# Patient Record
Sex: MALE | Race: OTHER | Employment: OTHER | URBAN - METROPOLITAN AREA
[De-identification: names, ages, dates, MRNs, and addresses within clinical notes are randomized per-mention and may not be internally consistent; named-entity substitution may affect disease eponyms.]

---

## 2022-02-10 ENCOUNTER — CONSULTATION/EVALUATION (OUTPATIENT)
Dept: URBAN - METROPOLITAN AREA CLINIC 39 | Facility: CLINIC | Age: 82
End: 2022-02-10

## 2022-02-10 DIAGNOSIS — H25.811: ICD-10-CM

## 2022-02-10 DIAGNOSIS — H35.3131: ICD-10-CM

## 2022-02-10 DIAGNOSIS — H25.812: ICD-10-CM

## 2022-02-10 DIAGNOSIS — Z98.890: ICD-10-CM

## 2022-02-10 DIAGNOSIS — H18.513: ICD-10-CM

## 2022-02-10 PROCEDURE — 92136TC INTERFEROMETRY - TECHNICAL COMPONENT

## 2022-02-10 PROCEDURE — 92025CV CORNEAL TOPOGRAPHY, CV

## 2022-02-10 PROCEDURE — 92015 DETERMINE REFRACTIVE STATE: CPT

## 2022-02-10 PROCEDURE — 92014 COMPRE OPH EXAM EST PT 1/>: CPT

## 2022-02-10 PROCEDURE — V2799PMN IMPRIMIS PRED-MOXI-NEPAF 5ML

## 2022-02-10 ASSESSMENT — VISUAL ACUITY
OD_SC: J12-
OD_RAM: 20/20
OS_AM: 20/20-2
OD_CC: J8
OS_BAT: 20/400
OD_SC: 20/40-2
OS_SC: J10
OS_SC: 20/50
OD_BAT: 20/400
OS_CC: J4

## 2022-02-10 ASSESSMENT — TONOMETRY
OD_IOP_MMHG: 12
OS_IOP_MMHG: 14

## 2022-02-10 NOTE — PATIENT DISCUSSION
The patient was informed that with [address] [facility] for distance, they will need glasses for all near and intermediate activities after surgery. The patient understands there is a possibility they may need an enhancement after surgery. The patient elects Custom Vision OD, goal of emmetropia.

## 2022-03-24 ENCOUNTER — PRE-OP/H&P (OUTPATIENT)
Dept: URBAN - METROPOLITAN AREA SURGERY 14 | Facility: SURGERY | Age: 82
End: 2022-03-24

## 2022-03-24 ENCOUNTER — SURGERY/PROCEDURE (OUTPATIENT)
Dept: URBAN - METROPOLITAN AREA CLINIC 39 | Facility: CLINIC | Age: 82
End: 2022-03-24

## 2022-03-24 DIAGNOSIS — H25.811: ICD-10-CM

## 2022-03-24 DIAGNOSIS — H21.81: ICD-10-CM

## 2022-03-24 DIAGNOSIS — H25.812: ICD-10-CM

## 2022-03-24 PROCEDURE — 99211T TECH SERVICE

## 2022-03-24 PROCEDURE — 66984CV REMOVE CATARACT, INSERT LENS, CUSTOM VISION

## 2022-03-24 PROCEDURE — 65772LRI LRI DURING CAT SX

## 2022-03-24 PROCEDURE — 66999LNSR LENSAR LASER FOR CAT SX

## 2022-03-24 NOTE — PATIENT DISCUSSION
The patient was informed that with [address] [facility] for distance, they will need glasses for all near and intermediate activities after surgery. The patient understands there is a possibility they may need an enhancement after surgery. The patient elects Custom Vision OD, goal of emmetropia.

## 2022-03-24 NOTE — PATIENT DISCUSSION
H&P essentially unchanged.
Monitor.
No Carvin Brittni, +Block, Endo, +/- M ring.
Not a candidate for ADV due to AMD.
Patient is cleared for anesthesia.
Patient made aware of 24/7 emergency services.
Patient understands there is an increased risk of corneal edema after cataract surgery.
Pt elects CASANDRA carrillo ou, pt aware of need for gls at near/intermediate focal points.
Reassess for Custom OS, goal of emmetropia.
The patient feels that the cataract is significantly impacting daily activities and has elected cataract surgery. The risks, benefits, and alternatives to surgery were discussed. The patient elects to proceed with surgery.
The patient was informed that with [address] [facility] for distance, they will need glasses for all near and intermediate activities after surgery. The patient understands there is a possibility they may need an enhancement after surgery. The patient elects Custom Vision OD, goal of emmetropia.
The types of intraocular lenses were reviewed with the patient along with a discussion of their various strengths and weaknesses.
Well healed.
none

## 2022-03-25 ENCOUNTER — POST-OP (OUTPATIENT)
Dept: URBAN - METROPOLITAN AREA CLINIC 39 | Facility: CLINIC | Age: 82
End: 2022-03-25

## 2022-03-25 DIAGNOSIS — Z96.1: ICD-10-CM

## 2022-03-25 PROCEDURE — 99024 POSTOP FOLLOW-UP VISIT: CPT

## 2022-03-25 ASSESSMENT — VISUAL ACUITY
OU_SC: J10
OS_CC: 20/40
OD_SC: J12
OS_SC: 20/60-2
OS_SC: J12
OD_SC: 20/80-1
OD_CC: 20/60-2
OU_SC: 20/60-1

## 2022-03-25 ASSESSMENT — TONOMETRY
OD_IOP_MMHG: 15
OS_IOP_MMHG: 15

## 2022-03-29 ENCOUNTER — POST OP/EVAL OF SECOND EYE (OUTPATIENT)
Dept: URBAN - METROPOLITAN AREA CLINIC 39 | Facility: CLINIC | Age: 82
End: 2022-03-29

## 2022-03-29 DIAGNOSIS — H35.3131: ICD-10-CM

## 2022-03-29 DIAGNOSIS — H25.812: ICD-10-CM

## 2022-03-29 DIAGNOSIS — H18.513: ICD-10-CM

## 2022-03-29 DIAGNOSIS — H21.81: ICD-10-CM

## 2022-03-29 DIAGNOSIS — Z96.1: ICD-10-CM

## 2022-03-29 PROCEDURE — 99024 POSTOP FOLLOW-UP VISIT: CPT

## 2022-03-29 PROCEDURE — 92012 INTRM OPH EXAM EST PATIENT: CPT

## 2022-03-29 ASSESSMENT — VISUAL ACUITY
OD_SC: 20/40-1
OD_SC: J10
OS_BAT: 20/400

## 2022-03-29 ASSESSMENT — TONOMETRY
OD_IOP_MMHG: 18
OS_IOP_MMHG: 16

## 2022-03-29 NOTE — PATIENT DISCUSSION
OK to proceed with IOL OS due to FD dec for Sx OS made today with K[facility] and goal is for Custom OS, goal of emmetropia.  pt knows may need NVO for ALL tasks arm's length and in with this Sx package.

## 2022-03-31 ENCOUNTER — SURGERY/PROCEDURE (OUTPATIENT)
Dept: URBAN - METROPOLITAN AREA CLINIC 39 | Facility: CLINIC | Age: 82
End: 2022-03-31

## 2022-03-31 DIAGNOSIS — H25.812: ICD-10-CM

## 2022-03-31 PROCEDURE — 66984CV REMOVE CATARACT, INSERT LENS, CUSTOM VISION

## 2022-03-31 PROCEDURE — 66999LNSR LENSAR LASER FOR CAT SX

## 2022-03-31 PROCEDURE — 65772LRI LRI DURING CAT SX

## 2022-04-01 ENCOUNTER — POST-OP (OUTPATIENT)
Dept: URBAN - METROPOLITAN AREA CLINIC 39 | Facility: CLINIC | Age: 82
End: 2022-04-01

## 2022-04-01 DIAGNOSIS — Z96.1: ICD-10-CM

## 2022-04-01 PROCEDURE — 99024 POSTOP FOLLOW-UP VISIT: CPT

## 2022-04-01 ASSESSMENT — VISUAL ACUITY
OS_SC: 20/30-1
OS_SC: J8
OD_SC: J8
OD_SC: 20/30-1

## 2022-04-01 ASSESSMENT — TONOMETRY
OD_IOP_MMHG: 18
OS_IOP_MMHG: 18

## 2022-04-22 ENCOUNTER — POST-OP (OUTPATIENT)
Dept: URBAN - METROPOLITAN AREA CLINIC 39 | Facility: CLINIC | Age: 82
End: 2022-04-22

## 2022-04-22 DIAGNOSIS — Z96.1: ICD-10-CM

## 2022-04-22 PROCEDURE — 99024 POSTOP FOLLOW-UP VISIT: CPT

## 2022-04-22 ASSESSMENT — TONOMETRY
OD_IOP_MMHG: 18
OS_IOP_MMHG: 18

## 2022-04-22 ASSESSMENT — VISUAL ACUITY
OS_SC: 20/25-1
OS_CC: J2
OD_CC: J3
OU_SC: 20/20
OU_CC: J1
OD_SC: 20/30-1
OD_SC: J12
OS_SC: J10

## 2024-02-13 ENCOUNTER — COMPREHENSIVE EXAM (OUTPATIENT)
Dept: URBAN - METROPOLITAN AREA CLINIC 39 | Facility: CLINIC | Age: 84
End: 2024-02-13

## 2024-02-13 DIAGNOSIS — H35.3221: ICD-10-CM

## 2024-02-13 DIAGNOSIS — H18.513: ICD-10-CM

## 2024-02-13 DIAGNOSIS — H35.3111: ICD-10-CM

## 2024-02-13 DIAGNOSIS — H52.03: ICD-10-CM

## 2024-02-13 DIAGNOSIS — H52.203: ICD-10-CM

## 2024-02-13 DIAGNOSIS — H52.4: ICD-10-CM

## 2024-02-13 DIAGNOSIS — H26.493: ICD-10-CM

## 2024-02-13 DIAGNOSIS — D17.0: ICD-10-CM

## 2024-02-13 PROCEDURE — 92134 CPTRZ OPH DX IMG PST SGM RTA: CPT

## 2024-02-13 PROCEDURE — 92014 COMPRE OPH EXAM EST PT 1/>: CPT

## 2024-02-13 PROCEDURE — 92015 DETERMINE REFRACTIVE STATE: CPT

## 2024-02-13 ASSESSMENT — VISUAL ACUITY
OD_SC: 20/30-1
OS_CC: J4
OD_SC: <J12
OS_SC: 20/30-1
OD_CC: J6
OS_SC: J12

## 2024-02-13 ASSESSMENT — TONOMETRY
OS_IOP_MMHG: 14
OD_IOP_MMHG: 14

## 2025-02-13 ENCOUNTER — COMPREHENSIVE EXAM (OUTPATIENT)
Age: 85
End: 2025-02-13

## 2025-02-13 DIAGNOSIS — H52.03: ICD-10-CM

## 2025-02-13 DIAGNOSIS — H35.3221: ICD-10-CM

## 2025-02-13 DIAGNOSIS — H52.4: ICD-10-CM

## 2025-02-13 DIAGNOSIS — H26.493: ICD-10-CM

## 2025-02-13 DIAGNOSIS — H18.513: ICD-10-CM

## 2025-02-13 DIAGNOSIS — H35.3111: ICD-10-CM

## 2025-02-13 DIAGNOSIS — H52.203: ICD-10-CM

## 2025-02-13 DIAGNOSIS — D17.0: ICD-10-CM

## 2025-02-13 PROCEDURE — 92015 DETERMINE REFRACTIVE STATE: CPT

## 2025-02-13 PROCEDURE — 92134 CPTRZ OPH DX IMG PST SGM RTA: CPT | Mod: 25

## 2025-02-13 PROCEDURE — 92014 COMPRE OPH EXAM EST PT 1/>: CPT
